# Patient Record
Sex: FEMALE | Race: WHITE
[De-identification: names, ages, dates, MRNs, and addresses within clinical notes are randomized per-mention and may not be internally consistent; named-entity substitution may affect disease eponyms.]

---

## 2017-11-01 NOTE — UC
Skin Complaint HPI





- HPI Summary


HPI Summary: 





Rash for the past week. She has seen flees and has pulled a few off of her 

thighs. It is mainly manoj inner thighs and left upper chest. It spares the hands 

and wrists as well as groin. 





- History of Current Complaint


Time Seen by Provider: 11/01/17 15:49


Stated Complaint: RASH


Hx Obtained From: Patient


Pregnant?: No


Onset/Duration: Gradual Onset, Lasting Days


Skin Exposure Onset/Duration: Days Ago


Onset Severity: Moderate


Location: Diffuse


Aggravating Factor(s): Touch


Alleviating Factor(s): Nothing


Associated Signs & Symptoms: Positive: Rash - It is an itchy rash.





- Allergy/Home Medications


Allergies/Adverse Reactions: 


 Allergies











Allergy/AdvReac Type Severity Reaction Status Date / Time


 


No Known Allergies Allergy   Verified 11/01/17 15:59











Home Medications: 


 Home Medications





NK [No Home Medications Reported]  11/01/17 [History Confirmed 11/01/17]











Review of Systems


Skin: Rash


All Other Systems Reviewed And Are Negative: Yes





PMH/Surg Hx/FS Hx/Imm Hx


Previously Healthy: Yes





- Surgical History


Surgical History: None





- Family History


Known Family History: Positive: Other - no related rashes.





- Social History


Lives: With Family





Physical Exam


Triage Information Reviewed: Yes


Appearance: Well-Appearing, No Pain Distress, Well-Nourished


Vital Signs Reviewed: Yes


Eyes: Positive: Conjunctiva Clear


ENT: Positive: Normal ENT inspection


Neck: Positive: Supple, Nontender, No Lymphadenopathy


Respiratory: Positive: Chest non-tender, Lungs clear, Normal breath sounds, No 

respiratory distress, No accessory muscle use


Cardiovascular: Positive: RRR, No Murmur, Pulses Normal, Brisk Capillary Refill


Abdomen Description: Positive: Nontender, No Organomegaly, Soft.  Negative: 

Distended, Guarding


Musculoskeletal: Positive: Strength Intact, ROM Intact, No Edema


Neurological: Positive: Alert, Muscle Tone Normal


Skin: Positive: rashes - Manoj inner thigh macular papular rash and the same in a 

patch on the left chest. No hand or wrist involvement.





Course/Dx





- Course


Course Of Treatment: rash without any signs of scabies. It may have started 

from a couch that she got donated to her from her aunt.





- Diagnoses


Provider Diagnoses: insect bites.  rash.





Discharge





- Discharge Plan


Condition: Good


Disposition: HOME


Prescriptions: 


Methylprednisolone [Medrol Dosepak 4 MG*] 4 mg PO .SEE BENITO INSTRUCTION #21 tab


Patient Education Materials:  Acute Rash (ED)


Referrals: 


No Primary Care Phys,NOPCP [Primary Care Provider] - 


Additional Instructions: 


Calmoseptin may help as well. REturn if this does not improve.

## 2018-08-20 NOTE — XMS REPORT
Roberta Springer

 Created on:2018



Patient:Roberta Springer

Sex:Female

:1995

External Reference #:[id number]





Demographics







 Address  [address] [place] [address]

 

 Mobile Phone  [phone]

 

 Preferred Language  English

 

 Marital Status  Not  Or 

 

 Taoism Affiliation  Unknown

 

 Race  White

 

 Ethnic Group  Not  Or 









Author







 Organization  [facility]

 

 Address  [address] [place] [address]/[address]



   [place] [address]

 

 Phone  [phone]









Care Team Providers







 Name  Role  Phone

 

 Jerry Moura FN  Care Team Information   Unavailable









Payers







 Type  Date  Identification Numbers  Payment Provider  Subscriber

 

 Commercial  Effective:  Policy Number: [id number]  Masood Springer



   2017      









 PayID: [id number]  [address]









 [place] [address]









 [employer]    Policy Number: [id number]  Medicaid[place]  Roberta Springer









 PayID: [id number]  [address]









 [place] [address]









 Commercial  Effective: 2015  Policy Number:  [employer] [employer] Danisha Springer



     [id number]    









 Expires: 2017  PayID: [id number]  [address] [place] [id number]







Problems







 Description

 

 No Information







Family History







 Date  Family Member(s)  Problem(s)  Comments

 

   Mother  42  

 

   Mother  Thyroid Disease  

 

   Mother  Heart Disease  

 

   Children  1  SON BORN 17







Social History







 Type  Date  Description  Comments

 

 Occupation    Manager  [employer]

 

 ETOH Use    Occasionally consumes alcohol  

 

 Smoking    Patient has never smoked  

 

 Daily Caffeine    Occasionally  







Allergies, Adverse Reactions, Alerts







 Date  Description  Reaction  Status  Severity  Comments

 

 01/10/2017  NKDA    active    







Medications







 Medication  Date  Status  Form  Strength  Qnty  SIG  Indications  Ordering



                 Provider

 

 Clobetasol    Active  Cream  0.05%  60gm  apply to  R21  Roberto,



 Propionate  /2018          affected area    Ahmad M.,



             twice a day    M.D.



             as needed    

 

 Topamax    Active  Tablets  50mg  60tab  1 tab by  G43.009          s  mouth twice a    Ahmad M.,



             day    M.D.

 

 Betamethasone    Active  Cream  0.05%  45gm  apply to  L20.9  Roberto,



 Dipropionate            affected area    Hardik ABEL,



             three times a    M.D.



             day as needed    

 

 Depo-Provera  10/10  Active  Suspension  150mg/ml  1unit  1cc every 3  Z30.42  
Roberto        s  mo    Hardik ABEL M.D.

 

                 

 

 No Active  01/10  Hx            Unknown



 Medications  /              



   -              



   01/10              



   /2017              

 

 Bisacodyl Ec  01/10  Hx  Tablets DR  5mg  30tab  Tab 1-2 Q hs  K59.00  Roberto,



           s  prn  For    Hardik ABEL



   -          Constipation    M.DYvonne



   09/10              



   /2017              

 

 Pre-Armando    Hx  Tablets        Z33.1  Unknown



 Formula  /              



   -              



   09/10              



   /2017              







Medications Administered in Office







 Medication  Date  Status  Form  Strength  Qnty  SIG  Indications  Ordering



                 Provider

 

 Depo Provera    Administered  Injection          Roberto,



 Injection  018              Hardik ABEL M.D.

 

 Depo Provera  10/10/2  Administered  Injection          White,



 Injection  017              Jerry FNP







Vital Signs







 Date  Vital  Result  Comment

 

 2018  BP Systolic  114 mmHg  









 BP Diastolic  64 mmHg  

 

 Height  69 inches  5'9"

 

 Weight  220.00 lb  

 

 BMI (Body Mass Index)  32.5 kg/m2  

 

 Heart Rate  102 /min  

 

 Respiratory Rate  16 /min  









 2018  BP Systolic  138 mmHg  









 BP Diastolic  62 mmHg  

 

 Height  69 inches  5'9"

 

 Weight  199.00 lb  

 

 BMI (Body Mass Index)  29.4 kg/m2  

 

 Heart Rate  94 /min  

 

 Respiratory Rate  16 /min  









 2017  BP Systolic  112 mmHg  









 BP Diastolic  62 mmHg  

 

 Height  69 inches  5'9"

 

 Weight  191.00 lb  

 

 BMI (Body Mass Index)  28.2 kg/m2  

 

 Heart Rate  95 /min  

 

 Respiratory Rate  16 /min  









 10/10/2017  BP Systolic  102 mmHg  









 BP Diastolic  58 mmHg  

 

 Height  69 inches  5'9"

 

 Weight  184.00 lb  

 

 BMI (Body Mass Index)  27.2 kg/m2  

 

 Heart Rate  76 /min  

 

 Respiratory Rate  16 /min  









 01/10/2017  BP Systolic  118 mmHg  









 BP Diastolic  62 mmHg  

 

 Height  69 inches  5'9"

 

 Weight  178.00 lb  

 

 BMI (Body Mass Index)  26.3 kg/m2  

 

 Heart Rate  76 /min  

 

 Respiratory Rate  16 /min  







Results







 Test  Date  Test  Result  H/L  Range  Note

 

 Lactic Acid  2017  Lactic Acid  0.6 mmol/L    0.4-1.9  1









 Lab Reflex >2.0 for Sepsis?  Y      1









 Urinalysis With Microscopic  2017  Urine Color  YELLOW    Yellow  1









 Urine Clarity  CLEAR    Clear  1

 

 Urine Glucose - Dipstick  NEGATIVE mg/dL    Negative  1

 

 Urine Bilirubin - Dipstick  MODERATE    Negative  1

 

 Urine Ketone  15 mg/dL  High  Negative  1

 

 Urine Specific Gravity  1.010    1.010-1.030  1

 

 Urine Blood  LARGE    Negative  1

 

 Urine PH  6.0  Low  6.5-7.5  1

 

 Urine Protein - Dipstick  30 mg/dL  High  Negative  1

 

 Urine Urobilinogen - Dipstick  0.2 E.U./dL    0.2-1.0  1

 

 Urine Nitrite - Dipstick  NEGATIVE    Negative  1

 

 Urine Leuk Esterase  MODERATE    Negative  1

 

 Urine RBC  0-2 rbc/hpf    0-2  1

 

 Urine WBC  10-20 wbc/hpf  High  0-7  1

 

 Urine Epithelial Cells  MANY /lpf    None Seen  1, 2

 

 Urine Bacteria  FEW    None Seen  1

 

 Urine Mucus  MODERATE    None Seen  1

 

 Source:  URINE, CLEAN CAT <SEE NOTE>      1, 3









 Urine Culture  2017  Urine Culture  URETHRAL SOPHIA      1









 Quantity  > 100,000 CFU/mL      1, 4









 CBC  2017  White Blood Count  9.8 K/uL    3.1-10.7  5









 Red Blood Count  4.32 M/uL    3.90-5.40  5

 

 Hemoglobin  11.8 gm/dL    11.6-15.8  5

 

 Hematocrit  36.0 %    36.0-46.1  5

 

 Mean Cell Volume  83.3 fl    80.9-99.0  5

 

 Mean Corpuscular HGB  27.3 pg    25.9-32.7  5

 

 Mean Corpuscular HGB Conc  32.8 g/dL    30.8-34.3  5

 

 Platelet Count  233 K/uL    150-400  5

 

 Red Cell Distri Width %CV  15.2 %  High  11.7-14.4  5

 

 Mean Platelet Volume  10.4 fL    8.9-12.4  5









 Type And Screen  2017  Patient Blood Type  A NEG      5









 Antibody Screen  POSITIVE    Negative  5









 Laboratory test  2017  Antibody Identification  RD      5, 6



 finding            

 

 Laboratory test  2017  Treponema Antibody  Negative    Negative  5, 7



 finding    [place]        

 

 Laboratory test  2017  Vaginal Strep Screen  NO GROUP B STREP      8, 9



 finding      <SEE NOTE>      

 

 Type And Screen  2017  Patient Blood Type  A NEG      10









 Antibody Screen  POSITIVE    Negative  10

 

 Comment:  [id number] [id number]      10









 Laboratory test finding  2017  Antibody Identification  RD      10

 

 Laboratory test finding  2017  Lead,Blood (Adult)  1 g/dL    0-19  11, 
12









 Varicella-Zoster Virus IgG Ab  503 index    Immune >165  11, 13









 Rubella Igg Antibody  2017  Rubella IgG Antibody  Reactive    Reactive  
11









 Rubella IgG Iu/ml  28.7 IU/mL    >=10.0  11, 14









 Laboratory test  2017  Treponema Antibody  Nonreactive    Nonreactive  11
, 15



 finding    [place]        









 Hepatitis B Surface Antigen  Nonreactive    Nonreactive  11, 16









 Laboratory test  2017  Antibody  Nonreactive    Nonreactive  11, 17



 finding    Detection-Hiv1/2 SCRN        

 

 Type And Screen  2017  Patient Blood Type  A NEG      11









 Antibody Screen  Negative    Negative  11









 CBS W/Automated Diff  2017  White Blood Count  7.0 K/uL    3.1-10.7  11









 Red Blood Count  4.76 M/uL    3.90-5.40  11

 

 Hemoglobin  14.2 gm/dL    11.6-15.8  11

 

 Hematocrit  41.0 %    36.0-46.1  11

 

 Mean Cell Volume  86.1 fl    80.9-99.0  11

 

 Mean Corpuscular HGB  29.8 pg    25.9-32.7  11

 

 Mean Corpuscular HGB Conc  34.6 g/dL  High  30.8-34.3  11

 

 Platelet Count  227 K/uL    155-360  11

 

 Red Cell Distri Width SD  36.8 fl    3-47  11

 

 Red Cell Distri Width %CV  12.1 %    11.7-14.4  11

 

 Mean Platelet Volume  9.8 fL    8.9-12.4  11

 

 Neut%  62.8 %    28.0-68.0  11

 

 Lymph %  30.7 %    20.0-42.0  11

 

 Mono %  5.4 %    4.3-13.2  11

 

 Eo%  1.0 %    0.0-6.6  11

 

 Bas%  0.1 %    0.0-1.1  11

 

 Neut#  4.39 K/uL    1.8-7.0  11

 

 Lymph #  2.15 K/uL    1.0-4.0  11

 

 Mono #  0.38 K/uL    0.3-0.9  11

 

 Eos #  0.07 K/uL    0.0-0.5  11

 

 Baso #  0.01 K/uL    0.0-0.1  11









 Urine Culture  2017  Urine Culture  URETHRAL SOPHIA      11









 Quantity  10,000 - 50,000  <SEE NOTE>      11, 18









 Ua RFX Micro & Culture II  2017  Urine Color  YELLOW    Yellow  11









 Urine Clarity  CLEAR    Clear  11

 

 Urine Glucose - Dipstick  NEGATIVE mg/dL    Negative  11

 

 Urine Bilirubin - Dipstick  NEGATIVE    Negative  11

 

 Urine Ketone  TRACE mg/dL  High  Negative  11

 

 Urine Specific Gravity  >=1.030    1.010-1.030  11

 

 Urine Blood  NEGATIVE    Negative  11

 

 Urine PH  6.0  Low  6.5-7.5  11

 

 Urine Protein - Dipstick  NEGATIVE mg/dL    Negative  11

 

 Urine Urobilinogen - Dipstick  0.2 E.U./dL    0.2-1.0  11

 

 Urine Nitrite - Dipstick  NEGATIVE    Negative  11

 

 Urine Leuk Esterase  NEGATIVE    Negative  11

 

 Source:  URINE, CLEAN CAT <SEE NOTE>      11, 19









 Drugs Of Abuse-Urine Screen 7  2017  Amphetamines (Urine)  Negative      
11









 Barbiturates (Urine)  Negative      11

 

 Benzodiazepines (Urine)  Negative      11

 

 Cannabinoids (Urine)  Negative      11

 

 Cocaine Metabolite (Urine)  Negative      11

 

 Methadone (Urine)  Negative      11

 

 Opiates (Urine)  Negative      11

 

 Urine Cutoffs  *      11, 20









 Chlamydia/GC Bobbi  2017  Chlamydia Trachomatis, Bobbi  Indeterminant    
Negative  11









 Neisseria Gonorrhoeae, Bobbi  Indeterminant    Negative  11

 

 Please note:  (SEE NOTE)      11, 21

 

 Specimen Type:  Genital      11









 Genital Culture W/ Gram  2017  Gram Stain  GRAM STAIN INDIC <SEE      11
, 22



 Stain      NOTE>      









 Gram Stain  MANY GR POS. BAC <SEE NOTE>      11, 23

 

 Gram Stain  NO WHITE BLOOD C <SEE NOTE>      11, 24

 

 Genital Culture  GENITAL SOPHIA      11









 CBC With Diff  01/10/2017  WBC  6.9 10*3/uL    (4.1-11.0)  









 RBC  4.73 10*6/uL    (4.00-5.40)  

 

 HGB  14.1 g/dL    (12.0-16.0)  

 

 HCT  42.5 %    (36.0-47.0)  

 

 MCV  89.9 fL    (80.0-95.0)  

 

 MCH  29.8 pg    (27.0-32.0)  

 

 MCHC  33.2 g/dL    (32.0-36.0)  

 

 RDW  12.6 %    (10.5-14.5)  

 

 PLT  238 10*3/uL    (150-450)  

 

 MPV  8.2 fL    (7.1-10.7)  

 

 Neut %  69.8 %    (35.0-75.0)  

 

 Lymph %  24.9 %    (16.0-52.0)  

 

 Mono %  4.0 %    (0.0-8.0)  

 

 Eos %  0.9 %    (0.0-5.0)  

 

 Baso %  0.4 %    (0.0-4.0)  

 

 Neut #  4.8 10*3/uL    (1.8-7.7)  

 

 Lymph #  1.7 10*3/uL    (1.2-4.8)  

 

 Mono #  0.3 10*3/uL    (0.0-0.8)  

 

 Eos #  0.1 10*3/uL    (0.0-0.5)  

 

 Baso #  0.0 10*3/uL    (0.0-0.2)  









 CMP  01/10/2017  Sodium  137 mmol/L    (136-145)  









 Potassium  3.6 mmol/L    (3.6-5.2)  

 

 Chloride  102 mmol/L    (100-108)  

 

 Co2  27 mmol/L    (22-31)  

 

 Anion Gap  8 mmol/L    (7-16)  

 

 Urea Nitrogen  8 mg/dL    (7-24)  

 

 Creatinine  0.67 mg/dL    (0.60-1.00)  

 

 BUN/Creat Ratio  11.9 RATIO    (10.0-20.0)  

 

 Glucose  106 mg/dL  High  (70-99)  

 

 Calcium  8.7 mg/dL    (8.4-10.2)  

 

 Total Protein  6.9 g/dL    (6.4-8.2)  

 

 Albumin  3.9 g/dL    (3.5-4.6)  

 

 Globulin  3.0 g/dL    (2.7-4.3)  

 

 Alb/Glob Ratio  1.3 RATIO      

 

 Alkaline Phosphatase  80 U/L    ()  

 

 Bilirubin,Total  0.6 mg/dL    (0.0-1.0)  

 

 Ast (Sgot)  15 U/L    (11-39)  

 

 Alt (SGPT)  26 U/L    (12-78)  

 

 GFR  >60 ml/min/1.73m2    (>59)  

 

 GFR ( Amer)  >60 ml/min/1.73m2    (>59)  

 

 GFR Interpretation  <SEE NOTE>      25









 Hemoglobin A1c  01/10/2017  Hemoglobin A1c @  4.6 %    (4.0-6.0)  









 Est Average Glucose  85 mg/dL      26









 Lipid  01/10/2017  Cholesterol @  189 mg/dL    (0-200)  









 Triglyceride @  106 mg/dL    ()  

 

 HDL Cholesterol @  38 mg/dL  Low  (>40)  27

 

 Chol/HDL Ratio  5.0 RATIO      28

 

 LDL Chol (Calc)  130 mg/dL  High  (<130)  29









 Laboratory test finding  01/10/2017  TSH,Ultrasensitive @  0.773 mIU/L    (
0.463-3.980)  









 1  FEVER 104

 

 2  POSSIBLE  UROGENITAL CONTAMINATION.

 

 3  URINE, CLEAN CATCH

 

 4  > 100,000 CFU/mL

 

 5   [place] [id number]

 

 6  Blood Type A NEG

 

 7  Performed at:   - [facility]  [id number]



   : Mykel Gan MD, Phone:  [phone]

 

 8  Z3A.33

 

 9  NO GROUP B STREPTOCOCCI ISOLATED

 

 10  Z34.02

 

 11  Z34.01

 

 12  Environmental Exposure:



   WHO Recommendation    <20



   Occupational Exposure:



   OSHA Lead Std          40



   DOMONIQUE                    30



   Detection Limit=1

 

 13  Negative          <135



   Equivocal    135 - 165



   Positive          >165



   A positive result generally indicates exposure to the



   pathogen or administration of specific immunoglobulins,



   but it is not indication of active infection or stage



   of disease.



   



   



   Performed at:  [place] - [facility]  [id number]



   : Araceli B Reyes MD, Phone:  [phone]

 

 14  Values >=10.0 IU/mL are positive for IgG antibodies to



   rubella virus and  are considered IMMUNE.

 

 15  Please Note:



   



   A nonreactive test result does not exclude the possibility



   of exposure to, or infection with syphilis.



   



   T. pallidum antibodies may be undetectable in some stages of



   the infection and in some clinical conditions.

 

 16  HBsAg not detected; does not exclude the possibility of



   exposure to or early acute infections with HBV.

 

 17  ***NOTE:  A NON-REACTIVE RESULT INDICATES THAT HIV-1



   AND HIV-2 ANTIBODIES HAVE NOT BEEN FOUND IN THIS PATIENT



   SPECIMEN.  A NON-REACTIVE RESULT, HOWEVER, DOES NOT PRECLUDE



   PREVIOUS EXPOSURE OF INFECTION WITH HIV.



   



   ************************************************************



   * [place] STATE LAW PROHIBITS THE REDISCLOSURE OF THIS RESULT   *



   * TO ANY UNAUTHORIZED PARTY.                               *



   ************************************************************

 

 18  10,000 - 50,000 CFU/mL

 

 19  URINE, CLEAN CATCH

 

 20  URINE SPECIMENS ARE SCREENED AT THE LISTED



   CUTOFFS



   



   DRUG CLASS                 INITIAL TEST LEVEL



   



   Amphetamines                   1000 ng/mL



   Barbiturates                    200 ng/mL



   Benzodiazepines                 200 ng/mL



   Cannabinoids                     50 ng/mL



   Cocaine Metabolite              300 ng/mL



   Methadone                       300 ng/mL



   Opiates                         300 ng/mL



   



   Any PRESUMPTIVE POSITIVE findings are UNCONFIRMED.



   Confirmatory testing is suggested if findings are



   unexpected.



   Please contact laboratory if confirmatory testing is



   desired. SPECIMENS ARE HELD FOR 72 HOURS.

 

 21  Unable to determine results due to possible interfering



   substance. Specimen recollection is recommended if



   clinically indicated.



   



   A negative result for either  C. trachomatis and/or



   N. gonorrhoeae does not preclued an infection because



   results are dependent on adequate specimen collection,



   absence of inhibitors, and sufficient DNA to be detected.



   A negative result for either  C. trachomatis and/or



   N. gonorrhoeae does not preclued an infection because



   results are dependent on adequate specimen collection,



   absence of inhibitors, and sufficient DNA to be detected.

 

 22  GRAM STAIN INDICATES NORMAL GENITAL SOPHIA

 

 23  MANY GR POS. BACILLI SUGGESTIVE OF LACTOBACILLUS SP.

 

 24  NO WHITE BLOOD CELLS

 

 25  



   ------------------------------------------



   NORMAL KIDNEY FUNCTION



      OR MILD DISEASE       - GFR >OR=60



   CHRONIC KIDNEY DISEASE   - GFR 15 - 59



   RENAL FAILURE            - GFR   <15



   ------------------------------------------



   Est. GFR calculation based on the MDRD



   study equation, which assumes a steady



   state for creatinine.  Est. GFR should not



   be used for medication dosing.

 

 26  HEMOGLOBIN A1c INTERPRETATION:



   -----------------------------



   4.0-6.0%  GOOD GLYCEMIC CONTROL



   6.1-6.5%  AT RISK FOR HYPERGLYCEMIA



   >6.5%     DIABETIC/ POOR GLYCEMIC CONTROL



   -----------------------------



   REFERENCE: DIABETES CARE 32(7), 



   IF A1c RESULT IS INCONSISTENT WITH



   CLINICAL ESTIMATES OF GLYCEMIC CONTROL,



   AN INTERFERING Hb VARIANT SHOULD BE



   CONSIDERED.

 

 27  PER NCEP ATP III GUIDELINES:



   RESULTS LOWER THAN 40 MG/DL ARE SUGGESTIVE



   OF INCREASED RISK FOR CORONARY ARTERY



   DISEASE.  RESULTS > OR=TO 60 MG/DL ARE



   CONSIDERED A NEGATIVE RISK FACTOR.

 

 28  INTERPRETATION OF CHOL-HDL RATIO



   



    CHD RISK        FEMALE      MALE



   VERY HIGH           >8.3       >14.3



   HIGH            5.6- 8.3   6.7- 14.3



   AVERAGE         3.7- 5.6   4.0-  6.7



   BELOW AVERAGE   2.5- 3.7   2.7-  4.0



   PROTECTED           <2.5        <2.7

 

 29  PER NCEP ATP III GUIDELINES:



      OPTIMAL          < 100



    NEAR OPTIMAL     100 - 129



   BORDERLINE HIGH   130 - 159



       HIGH          160 - 189



     VERY HIGH         > 189







Procedures







 Date  CPT Code  Description  Status

 

 2018  89197  Injection DX/Therapeutic/Prophy  Completed

 

 10/10/2017  72161  Injection DX/Therapeutic/Prophy  Completed

 

 01/10/2017  38372  Visual Screening Test  Completed

 

 01/10/2017  65014  Audiometry, Bekesy, Screening  Completed







Encounters







 Type  Date  Location  Provider  CPT E/M  Dx

 

 Office Visit  2018  4:30p  Hardik Contreras M.D.  26453  L20.9









 J30.9

 

 Z30.42

 

 E66.3

 

 G43.009

 

 R21

 

 L50.9









 Office Visit  2018  9:15a  Hardik Contreras M.D.  49527  L20.9









 J30.9

 

 Z30.42

 

 E66.3









 Office Visit  2017  9:30a  Hardik Contreras M.D.  38080  L20.9









 J30.9

 

 R21

 

 L50.9

 

 Z30.42









 Office Visit  10/10/2017 11:45a  Jerry Rondon [facility]  [address]  Z39.2









 Z30.42









 Office Visit  01/10/2017 10:15a  Jerry Rondon [facility]  [address]  Z33.1









 K59.00

 

 Z00.01







Plan of Care

2018 - Hardik Mejia M.D.L20.9 Atopic dermatitis, unspecifiedComments:
SKIN CARE INSTRUCTIONS  LOTION OR BABY OIL 2-3  APPLICATION PER DAYUSE 
MOISTURIZING SOAPAVOID PROLONGED WATER EXPOSUREAVOID USING HOT WATER IN 
OCEUMET49.9 Allergic rhinitis, unspecifiedComments:INCREASE PO FLUID USE 
ANTIHISTAMINE PRN SECOND HAND SMOKING AVOIDANCEZ30.42 Encounter for 
surveillance of injectable contraceptiveComments:CONTINUE RX F/U WITH OB/
GYNNE66.3 OverweightComments:EXERCISE REGURALYDIET JKXEMVOHAARN29.009 Migraine w
/o aura, not intractable, w/o status migrainosusNew Medication:Topamax 50 
mgComments:TYLENOL OR MOTRIN PRNRELAXATION/AVOID STRESSORSFollow up:3 [id number] 
Rash and other nonspecific skin eruptionNew Medication:Clobetasol Propionate 
0.05 %Comments:SKIN CARE INSTRUCTIONS  LOTION OR BABY OIL 2-3  APPLICATION PER 
DAYUSE MOISTURIZING SOAPAVOID PROLONGED WATER EXPOSUREAVOID USING HOT WATER IN 
SHOWERReferral:Mesfin Fuller MD, TkklhjgyntgZ83.9 Urticaria, 
unspecifiedComments:SKIN CAREAVOID ITCHING/SCRATCHING SKINLOTION PRN BENEDRYL/ 
ANTIHISTAMINE PRN

## 2018-08-20 NOTE — UC
Complaint Female HPI





- HPI Summary


HPI Summary: 





23 yo female presents with urinary burning and frequency for the last week and 

vaginal discharge for the last 3 days. She has been taking AZO supplements OTC 

for her urinary symptoms with mild relief for a day or two, but her symptoms 

will return. Has also noticed some thick white vaginal discharge with itching. 

She has an IUD in place for about a year and says that she has scant bleeding 

at random times, but no real period. Denies fever, chills, flank pain, 

abdominal pain, n/v/d/c, or pelvic pain.





- History Of Current Complaint


Stated Complaint: URINARY COMPLAINT


Time Seen by Provider: 08/20/18 17:20


Hx Obtained From: Patient


Hx Last Menstrual Period: IUD


Onset/Duration: Gradual Onset


Severity Currently: None





- Allergies/Home Medications


Allergies/Adverse Reactions: 


 Allergies











Allergy/AdvReac Type Severity Reaction Status Date / Time


 


No Known Allergies Allergy   Verified 08/20/18 17:38











Home Medications: 


 Home Medications





Topiramate [Topamax] 25 mg PO BID 08/20/18 [History Confirmed 08/20/18]











PMH/Surg Hx/FS Hx/Imm Hx





- Additional Past Medical History


Additional PMH: 





None


Previously Healthy: Yes





- Surgical History


Surgical History: None





- Family History


Known Family History: Positive: None





- Social History


Occupation: Employed Full-time


Lives: With Family


Alcohol Use: None


Substance Use Type: None


Smoking Status (MU): Never Smoked Tobacco





- Immunization History


Most Recent Influenza Vaccination: NOT CURRENT





Review of Systems


Constitutional: Negative


Skin: Negative


Respiratory: Negative


Cardiovascular: Negative


Gastrointestinal: Negative


Genitourinary: Dysuria, Vaginal/Penile Discharge


Neurological: Negative


Psychological: Negative


All Other Systems Reviewed And Are Negative: Yes





Physical Exam





- Summary


Physical Exam Summary: 





 


GENERAL: NAD. WDWN. No pain distress.


SKIN: No rashes, sores, lesions, or open wounds.


NECK: Supple. Nontender. No lymphadenopathy. 


CHEST:  CTAB. No r/r/w. No accessory muscle use. Breathing comfortably and in 

no distress.


CV:  RRR. Without m/r/g. Pulses intact. Cap refill <2seconds


ABDOMEN:  Soft. NTTP. No distention or guarding. No CVA tenderness. Bowel 

sounds present


NEURO: Alert. CN II-XII grossly intact.


PSYCH: Age appropriate behavior.


Triage Information Reviewed: Yes


Vital Signs: 





Vital Signs:











Temp Pulse Resp BP Pulse Ox


 


 98.8 F   82   16   120/84   100 


 


 08/20/18 17:32  08/20/18 17:32  08/20/18 17:32  08/20/18 17:32  08/20/18 17:32








 Laboratory Tests











  08/20/18





  17:46


 


POC Urine Color  Yellow


 


POC Urine Clarity  Cloudy


 


POC Urine pH  6.0


 


POC Ur Specif Gravity  1.020


 


POC Urine Protein  Negative


 


POC Ur Glucose (UA)  Negative


 


POC Urine Ketones  Negative


 


POC Urine Blood  Trace-intact A


 


POC Urine Nitrite  Negative


 


POC Urine Bilirubin  Negative


 


POC Urine Urobilinogen  0.2


 


POC U Leukocyte Esteras  2+ A











Vital Signs Reviewed: Yes


Pelvic Exam: Positive: External Exam Normal, No Cerv. Motion Tender, No Masses, 

Discharge - Mild thick white, Other - Marcela RN assisted with exam.  Negative: 

Active Bleeding, Blood, Cervicitis, Lesions, Mass, Ulcers





 Complaint Female Dx





- Course


Course Of Treatment: UA with signs of infection and pelvic exam suggestive for 

yeast. Pt declines STD testing at this time. Will treat for UTI and yeast 

infection. F/u prn





- Differential Dx/Diagnosis


Provider Diagnoses: UTI.  Vaginal yeast infection





Discharge





- Sign-Out/Discharge


Documenting (check all that apply): Patient Departure


All imaging exams completed and their final reports reviewed: No Studies





- Discharge Plan


Condition: Stable


Disposition: HOME


Prescriptions: 


Fluconazole 150 MG (NF) [Diflucan 150 mg (NF)] 150 mg PO ONCE #2 tab


Sulfamethox/Trimethoprim DS* [Bactrim /160 TAB*] 1 tab PO BID #10 tab


Patient Education Materials:  Urinary Tract Infection in Women (DC), Yeast 

Infection (ED)


Referrals: 


Hardik Mejia MD [Primary Care Provider] - 


Additional Instructions: 


If you develop a fever, shortness of breath, chest pain, new or worsening 

symptoms - please call your PCP or go to the ED.


 








- Billing Disposition and Condition


Condition: STABLE


Disposition: Home

## 2018-08-22 NOTE — UC
- Progress Note


Progress Note: 





urine culture neg d/c bactrim


+ candida  - pt on diflucan


+ gardnerella-  Rx flagyl sent to pharmacy


please update pt


ljj 








Discharge





- Sign-Out/Discharge


Documenting (check all that apply): Post-Discharge Follow Up


All imaging exams completed and their final reports reviewed: No Studies





- Discharge Plan


Condition: Stable


Disposition: HOME


Prescriptions: 


Fluconazole 150 MG (NF) [Diflucan 150 mg (NF)] 150 mg PO ONCE #2 tab


Sulfamethox/Trimethoprim DS* [Bactrim /160 TAB*] 1 tab PO BID #10 tab


Patient Education Materials:  Urinary Tract Infection in Women (DC), Yeast 

Infection (ED)


Referrals: 


Hardik Mejia MD [Primary Care Provider] - 


Additional Instructions: 


If you develop a fever, shortness of breath, chest pain, new or worsening 

symptoms - please call your PCP or go to the ED.


 








- Billing Disposition and Condition


Condition: STABLE


Disposition: Home

## 2020-03-18 ENCOUNTER — HOSPITAL ENCOUNTER (EMERGENCY)
Dept: HOSPITAL 25 - UCCORT | Age: 25
Discharge: HOME | End: 2020-03-18
Payer: COMMERCIAL

## 2020-03-18 VITALS — DIASTOLIC BLOOD PRESSURE: 87 MMHG | SYSTOLIC BLOOD PRESSURE: 121 MMHG

## 2020-03-18 DIAGNOSIS — J06.9: Primary | ICD-10-CM

## 2020-03-18 PROCEDURE — 99211 OFF/OP EST MAY X REQ PHY/QHP: CPT

## 2020-03-18 PROCEDURE — 87651 STREP A DNA AMP PROBE: CPT

## 2020-03-18 PROCEDURE — G0463 HOSPITAL OUTPT CLINIC VISIT: HCPCS

## 2020-03-18 NOTE — UC
Throat Pain/Nasal Art HPI





- HPI Summary


HPI Summary: 


24-year-old woman comes in with chief complaint of upper respiratory tract 

infection symptoms for one day.  Scar little bit of rhinorrhea.  Does have 

postnasal drip.  She had chills this morning.  When she went to work they said 

she had an elevated temperature in the range of 99.  No shortness of breath.  

The postnasal drip does make her cough.  Her son was diagnosed with influenza 3 

days ago.





- History of Current Complaint


Chief Complaint: UCGeneralIllness


Stated Complaint: COUGH,FEVER


Time Seen by Provider: 03/18/20 13:58


Hx Last Menstrual Period: IUD


Pain Intensity: 0





- Allergies/Home Medications


Allergies/Adverse Reactions: 


 Allergies











Allergy/AdvReac Type Severity Reaction Status Date / Time


 


No Known Allergies Allergy   Verified 03/18/20 14:11











Home Medications: 


 Home Medications





Ibuprofen TAB* [Motrin TAB* 400 MG] 400 mg PO Q6H PRN 03/18/20 [History 

Confirmed 03/18/20]











PMH/Surg Hx/FS Hx/Imm Hx


Previously Healthy: Yes





- Surgical History


Surgical History: None





- Family History


Known Family History: Positive: None, Other - no related rashes.





- Social History


Alcohol Use: Occasionally


Substance Use Type: None


Smoking Status (MU): Never Smoked Tobacco





- Immunization History


Most Recent Influenza Vaccination: NOT CURRENT





Review of Systems


All Other Systems Reviewed And Are Negative: Yes


Constitutional: Positive: Other - see hpi


Skin: Positive: Negative


Eyes: Positive: Negative


ENT: Positive: Nasal Discharge, Other - see hpi


Respiratory: Positive: Cough - see hpi


Cardiovascular: Positive: Negative


Gastrointestinal: Positive: Negative


Motor: Positive: Negative


Neurovascular: Positive: Negative


Musculoskeletal: Positive: Negative


Neurological/Mental Status: Positive: Negative


Psychological: Positive: Negative


Is Patient Immunocompromised?: No





Physical Exam


Triage Information Reviewed: Yes


Appearance: Well-Appearing, No Pain Distress, Well-Nourished


Vital Signs: 


 Initial Vital Signs











Temp  98.9 F   03/18/20 14:08


 


Pulse  107   03/18/20 14:08


 


Resp  15   03/18/20 14:08


 


BP  121/87   03/18/20 14:08


 


Pulse Ox  97   03/18/20 14:08











Vital Signs Reviewed: Yes


Eye Exam: Normal


Eyes: Positive: Conjunctiva Clear


ENT: Positive: Pharynx normal, Nasal congestion, TMs normal


Neck: Positive: Supple


Respiratory: Positive: Lungs clear, Normal breath sounds, No respiratory 

distress


Cardiovascular: Positive: RRR


Musculoskeletal: Positive: Strength Intact, ROM Intact


Neurological: Positive: Alert, Muscle Tone Normal


Psychological: Positive: Age Appropriate Behavior


Skin Exam: Normal





Throat Pain/Nasal Course/Dx





- Differential Dx/Diagnosis


Provider Diagnosis: 


 Upper respiratory infection








Discharge ED





- Sign-Out/Discharge


Documenting (check all that apply): Patient Departure


All imaging exams completed and their final reports reviewed: No Studies





- Discharge Plan


Condition: Stable


Disposition: HOME


Patient Education Materials:  Upper Respiratory Infection (ED)


Forms:  *Work Release


Referrals: 


Gail Mcdermott MD [Primary Care Provider] - 


Additional Instructions: 


FOLLOW UP WITH YOUR DOCTOR IF NOT COMPLETELY IMPROVED.


GET REEVALUATED IF NOT IMPROVING OR WORSE OR ANY QUESTIONS OR CONCERNS.








- Billing Disposition and Condition


Condition: STABLE


Disposition: Home

## 2020-03-18 NOTE — XMS REPORT
Continuity of Care Document (CCD)

 Created on:2020



Patient:Roberta Springer

Sex:Female

:1995

External Reference #:[id number]





Demographics







 Address  [address] [place] [address]

 

 Home Phone  [phone]

 

 Mobile Phone  [phone]

 

 Email Address  [email or web address]

 

 Preferred Language  en

 

 Marital Status  Not  or 

 

 Hinduism Affiliation  Unknown

 

 Race  White

 

 Ethnic Group  Not  or 









Author







 Name  Shanad Vale PA

 

 Address  [address] [place] [id number]









Care Team Providers







 Name  Role  Phone

 

 Gail Mcdermott MD, PHD - Family  Care Team Information   [phone]
[phone]



 Medicine    









Problems







 Active Problems  Provider  Date

 

 FH: Polycystic kidney  Gail Mcdermott MD, PHD  Onset: 2019

 

 Obesity  Gail Mcdermott MD, PHD  Onset: 2019

 

 Migraine  Gail Mcdermott MD, PHD  Onset: 2019

 

 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



 limb    

 

 Open wound of knee and/or leg and/or ankle  Shanda Vale PA  Onset: 2018







Social History







 Type  Date  Description  Comments

 

 Birth Sex    Unknown  

 

 Tobacco Use  Start: Unknown  Never Smoked Cigarettes  

 

 ETOH Use    Occasionally consumes alcohol  

 

 Recreational Drug Use    Never Used Drugs  

 

 Tobacco Use  Start: Unknown  Patient denies history of smoking  

 

 Smoking Status  Reviewed: 20  Patient denies history of smoking  







Allergies, Adverse Reactions, Alerts







 Description

 

 No Known Drug Allergies







Medications







 Active Medications  SIG  Qnty  Indications  Ordering  Date



         Provider  

 

 Amitriptyline HCL  1 tab by mouth  30tabs  G43.909  Gail Mcdermott,  2019



               25mg  at bedtime      MD, PHD  



 Tablets          



           

 

 Idalia  Expires 21      Gail Mcdermott,  2019



   13.5mg IUD        MD, PHD  



           

 

 Magnesium Gluconate  1 tab by mouth  90tabs  G43.909  Gail Mcdermott,  2019



                 500mg  every day      MD, PHD  



 Tablets          



           

 

 Co Q-10 Maximum  1 caps by mouth  90caps  G43.909  Gail Mcdermott,  2019



 Strength  every day      MD, PHD  



      400mg Capsules          



           

 

 Vitamin B-6  1 by mouth every  30tabs  G43.909  Gail Mcdermott,  2019



         25mg Tablets  day      MD, PHD  



           

 

 Metoclopramide HCL  5 ml by mouth as  1000ml  G43.909  Gail Mcdermott,  2019



   needed for      MD, PHD  



 10mg/10ML Solution  migraine and        



   nausea, MDD 15ml        









 History Medications









 No Active Medications        Unknown  2019 -



           2019

 

 Amitriptyline HCL  1 tab by mouth  30tabs  G43.909  Gail Mcdermott,  2019 -



                10mg  every day      MD, PHD  2019



 Tablets          



           







Immunizations







 Description

 

 No Information Available







Vital Signs







 Date  Vital  Result  Comment

 

 2020  9:45am  BP Systolic Sitting Right Arm  137 mmHg  









 BP Diastolic Sitting Right Arm  78 mmHg  









 2020  9:44am  Weight  194.00 lb  







Results







 Test  Acquired Date  Facility  Test  Result  H/L  Range  Note

 

 CBC  2019  [facility]  White Blood  6.2 K/uL  Normal  3.1-10.7  1



 W/Automated    [address]  Count        



 Diff    [place], [place] [id number] [phone]          









 Red Blood Count  5.16 M/uL  Normal  3.90-5.40  

 

 Hemoglobin  15.4 gm/dL  Normal  11.6-15.8  

 

 Hematocrit  45.0 %  Normal  36.0-46.1  

 

 Mean Cell Volume  87.2 fl  Normal  80.9-99.0  

 

 Mean Corpuscular HGB  29.8 pg  Normal  25.9-32.7  

 

 Mean Corpuscular HGB Conc  34.2 g/dL  Normal  30.8-34.3  

 

 Platelet Count  274 K/uL  Normal  155-360  

 

 Red Cell Distri Width SD  38.5 fl  Normal  36-47  

 

 Red Cell Distri Width %CV  12.0 %  Normal  11.7-14.4  

 

 Mean Platelet Volume  10.1 fl  Normal  8.9-12.4  

 

 Neut%  58.4 %  Normal  40.4-72.8  

 

 Lymph %  32.6 %  Normal  20.0-42.0  

 

 Mono %  5.8 %  Normal  4.3-13.2  

 

 Eo%  2.4 %  Normal  0.0-6.6  

 

 Bas%  0.5 %  Normal  0.0-1.1  

 

 Immature Grans  0.3 %  Normal  0.0-5.0  

 

 NRBC %  0.0 /100WBC    < 10/ 100 WBC  

 

 Neut#  3.60 K/uL  Normal  1.8-7.0  

 

 Lymph #  2.01 K/uL  Normal  1.0-4.0  

 

 Mono #  0.36 K/uL  Normal  0.3-0.9  

 

 Eos #  0.15 K/uL  Normal  0.0-0.5  

 

 Baso #  0.03 K/uL  Normal  0.0-0.1  

 

 Immature Grans Absolute  0.02 K/uL      

 

 NRBC #  0.00 K/uL      









 Comprehensive  2019  [facility]  Glucose  92 mg/dL  Normal    



 Metabolic Panel    [address]          



     [place] [address] [phone]          









 BUN  9 mg/dL  Normal  7-18  

 

 Creatinine  0.7 mg/dL  Normal  0.6-1.3  

 

 Glom Filtration Rate, Estimate  >60 mL/min    >60  

 

 If African American  >60 mL/min    >60  2

 

 BUN/Creat  12.8 ratio      

 

 Sodium  138 mmol/L  Normal  136-145  

 

 Potassium  4.0 mmol/L  Normal  3.5-5.1  

 

 Chloride  109 mmol/L  High    

 

 Carbon Dioxide  22 mmol/L  Normal  21-32  

 

 Anion Gap  7 mEq/L  Low  8-16  

 

 Calcium  9.1 mg/dL  Normal  8.5-10.1  

 

 Total Protein  7.7 g/dL  Normal  6.4-8.2  

 

 Albumin  3.9 g/dL  Normal  3.4-5.0  

 

 Globulin  3.8 g/dL  Normal  1.9-4.3  

 

 Alb/Glob  1.0 ratio      

 

 Bilirubin,Total  0.4 mg/dL  Normal  0.2-1.0  

 

 Sgot/Ast  13 U/L  Low  15-37  3

 

 SGPT/Alt  33 U/L  Normal  12-78  

 

 Alkaline Phosphatase  102 U/L  Normal    









 Direct LDL  2019  [facility]  LDL Chol.  130  High  0-99  4



 Cholesterol    [address]  (Direct)  mg/dL      



     [place] [address] [phone]          

 

 Glycohemoglobin  2019  [facility]  Glycohemoglobin  4.9 %  Normal  4.2-6.  5



 A1c    [address]  (A1c)      3  



     [place] [address]          



     [phone]          









 eAG  94 mg/dL      









 Laboratory test  2019  [facility]  Thyroid  0.69  Normal  0.30-4.20  



 finding    [address]  Stim  uIU/mL      



     [place] [address]  Hormone        



     [phone]          









 C-Reactive Protein,Quant  < 3.0 mg/L    <3.0  

 

 Sedimentation Rate  18 mm/hr  Normal  2-40  6









 1  E66.9 G43.909 Z82.71

 

 2  Note:



   Persistent reduction for 3 months or more in an eGFR <60



   mL/min/1.73 m2 defines CKD.  Patients with eGFR values >/=60



   mL/min/1.73 m2 may also have CKD if evidence of persistent



   proteinuria is present.



   



   The original MDRD equation for estimated GFR is not valid



   for patients less than 18 years of age.



   



   Additional information may be found at www.kdoqi.org.

 

 3  Values below the stated reference ranges of AST and ALT can



   be seen in normal populations. Clinical correlation is



   suggested.

 

 4  Performed at:  RN - [facility] [address]  [id number]



   : Araceli B Reyes MD, Phone:  [phone]

 

 5  Elevated levels of HbA1c suggest the need for more



   aggressive treatment of glycemia.



   



   The American Diabetes Association recommends that a primary



   goal of therapy should be a HbA1c of <7% and that physicians



   should re-evaluate the treatment regimen in patients with



   HbA1c values consistently >8%.

 

 6  This result was obtained with an ESR method that is not



   based on the standard Westergren Method.



   



   When comparing results obtained from the traditional



   Westergren ESR and this method it is important to refer to



   the reference range for each method.



   



   Method: Capillary Photometry







Procedures







 Date  Code  Description  Status

 

 2019  [address]  Radiology, Distal Femur--Knee 1 Or 2 Views  Completed







[employer]







 Description

 

 No Information Available







Encounters







 Type  Date  Location  Provider  Dx  Diagnosis

 

 Office Visit  2020  [facility]  Shanda Vale,  S81.012D  
Laceration



   9:45a    [place]    without foreign



           body, left knee,



           subs encntr









 M25.562  Pain in left knee

 

 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









 Office Visit  2019  [facility]  Shanda Vale,  S81.012D  Laceration



   1:45p  Office  [place]    without foreign



           body, left knee,



           subs encntr









 M25.562  Pain in left knee

 

 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









 Office Visit  2019  1:30p  [facility]  Shanda Vale,  M25.562  
Pain in



       PA    left knee









 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

 

 S81.012D  Laceration without foreign body, left knee, subs encntr









 Office Visit  2019  1:45p  [facility]  [place]  G43.909  neha Munoz [place], MD,    not intractable,



       PHD    without status



           migrainosus









 R21  Rash and other nonspecific skin eruption









 Office Visit  2019 11:00a  [facility]  LOUIS Mcdermott3.909  neha Munoz [place], MD,    not intractable,



       PHD    without status



           migrainosus









 E66.9  Obesity, unspecified

 

 Z82.71  Family history of polycystic kidney

 

 J00  Acute nasopharyngitis [common cold]







Assessments







 Date  Code  Description  Provider

 

 2020  S81.012D  Laceration without foreign body, left  Shanda Vale, PA



     knee, subsequent encou  

 

 2020  M25.562  Pain in left knee  KavinFemiin, PA

 

 2020  D16.22  Benign neoplasm of long bones of left  Kavin Shanda, PA



     lower limb  

 

 2019  S81.012D  Laceration without foreign body, left  KavinFemiin, PA



     knee, subsequent encou  

 

 2019  M25.562  Pain in left knee  Kavin, Shanda, PA

 

 2019  D16.22  Benign neoplasm of long bones of left  Kavin, Shanda, PA



     lower limb  

 

 2019  M25.562  Pain in left knee  Kavin Shanda, PA

 

 2019  D16.22  Benign neoplasm of long bones of left  Kavin, Shanda, PA



     lower limb  

 

 2019  S81.012D  Laceration without foreign body, left  Shanda Vale, PA



     knee, subsequent encou  

 

 2019  G43.909  Migraine, unspecified, not intractable,  Gail Mcdermott MD, PHD



     without status migrainosus  

 

 2019  R21  Rash and other nonspecific skin eruption  Gail Mcdermott MD
, PHD

 

 2019  G43.909  Migraine, unspecified, not intractable,  Gail Mcdermott MD, PHD



     without status migrainosus  

 

 2019  E66.9  Obesity, unspecified  Gail Mcdermott MD, PHD

 

 2019  Z82.71  Family history of polycystic kidney  Gail Mcdermott MD, 
PHD

 

 2019  J00  Acute nasopharyngitis [common cold]  Gail Mcdermott MD, PHD







Plan of Treatment

Future Appointment(s):2020 10:00 am - Shanda Vale PA at [id number] - Shanda Vale, PAS81.012D Laceration without foreign body, 
left knee, subsequent encouComments:I have explained to her that we have 
exhausted conservative management.  If the pain is continuing to bother her 
that I recommend follow-up with her surgeon to discuss a possible diagnostic 
arthroscopy.  Patient states that it is not bothering her that much.  She'll 
continue with the brace, ice and rest when able.  She will follow up on an as-
needed basis for the knee.M25.562 Pain in left kneeD16.22 Benign neoplasm of 
long bones of left lower limbComments:Patient should follow up in 1 year for 
follow-up x-rays to monitor for no changes in the osteochondromas.AllComments:1 
year follow up for Xrayinj last visit help short termnot want surgery



Functional Status







 Functional Condition  Comment  Date  Status

 

 Independent with all ADL's      Active







Mental Status







 Description

 

 No Information Available







Referrals







 Refer to   Reason for Referral  Status  Appt Date

 

 Carlos Lafleur MD  [facility] - has an appointment already  Closed  



   schedule with Dr. Lafleur on  - please    



   send form for continuity of care.    









 [address]

 

 [place], [place]

 

 [address] [phone]